# Patient Record
Sex: FEMALE | Race: WHITE | NOT HISPANIC OR LATINO | Employment: OTHER | URBAN - METROPOLITAN AREA
[De-identification: names, ages, dates, MRNs, and addresses within clinical notes are randomized per-mention and may not be internally consistent; named-entity substitution may affect disease eponyms.]

---

## 2020-09-29 DIAGNOSIS — G40.009 PARTIAL IDIOPATHIC EPILEPSY WITH SEIZURES OF LOCALIZED ONSET, NOT INTRACTABLE, WITHOUT STATUS EPILEPTICUS (H): ICD-10-CM

## 2020-09-29 DIAGNOSIS — G40.209 EPILEPSY WITH PARTIAL COMPLEX SEIZURES (H): Primary | ICD-10-CM

## 2020-09-29 PROBLEM — R41.3 AMNESIA: Status: ACTIVE | Noted: 2020-09-29

## 2020-09-29 PROBLEM — M54.17 LUMBOSACRAL RADICULOPATHY: Status: ACTIVE | Noted: 2020-09-29

## 2020-09-29 PROBLEM — G62.9 NEUROPATHY: Status: ACTIVE | Noted: 2020-09-29

## 2020-09-29 RX ORDER — LEVETIRACETAM 500 MG/1
1000 TABLET ORAL AT BEDTIME
Qty: 180 TABLET | Refills: 0 | Status: SHIPPED | OUTPATIENT
Start: 2020-09-29 | End: 2021-02-08

## 2020-09-29 RX ORDER — LEVETIRACETAM 500 MG/1
TABLET ORAL
COMMUNITY
Start: 2019-12-26 | End: 2020-09-29

## 2020-10-16 ENCOUNTER — VIRTUAL VISIT (OUTPATIENT)
Dept: NEUROLOGY | Facility: CLINIC | Age: 85
End: 2020-10-16
Payer: COMMERCIAL

## 2020-10-16 VITALS — BODY MASS INDEX: 29.19 KG/M2 | HEIGHT: 64 IN | WEIGHT: 171 LBS

## 2020-10-16 DIAGNOSIS — R41.3 MEMORY DIFFICULTY: ICD-10-CM

## 2020-10-16 DIAGNOSIS — G40.209 PARTIAL SYMPTOMATIC EPILEPSY WITH COMPLEX PARTIAL SEIZURES, NOT INTRACTABLE, WITHOUT STATUS EPILEPTICUS (H): Primary | ICD-10-CM

## 2020-10-16 PROCEDURE — 99213 OFFICE O/P EST LOW 20 MIN: CPT | Mod: 95 | Performed by: PSYCHIATRY & NEUROLOGY

## 2020-10-16 RX ORDER — CALCIUM CARBONATE 500(1250)
1 TABLET ORAL 2 TIMES DAILY
COMMUNITY
End: 2024-01-23

## 2020-10-16 RX ORDER — LEVETIRACETAM 500 MG/1
500 TABLET ORAL AT BEDTIME
Qty: 90 TABLET | Refills: 3 | Status: SHIPPED | OUTPATIENT
Start: 2020-10-16 | End: 2021-02-03 | Stop reason: DRUGHIGH

## 2020-10-16 RX ORDER — OMEGA-3 FATTY ACIDS/FISH OIL 300-1000MG
CAPSULE ORAL
COMMUNITY
End: 2024-01-23

## 2020-10-16 RX ORDER — LOSARTAN POTASSIUM 50 MG/1
50 TABLET ORAL DAILY
COMMUNITY
Start: 2020-09-28

## 2020-10-16 SDOH — HEALTH STABILITY: MENTAL HEALTH: HOW OFTEN DO YOU HAVE 6 OR MORE DRINKS ON ONE OCCASION?: NOT ASKED

## 2020-10-16 SDOH — HEALTH STABILITY: MENTAL HEALTH: HOW MANY STANDARD DRINKS CONTAINING ALCOHOL DO YOU HAVE ON A TYPICAL DAY?: NOT ASKED

## 2020-10-16 SDOH — HEALTH STABILITY: MENTAL HEALTH: HOW OFTEN DO YOU HAVE A DRINK CONTAINING ALCOHOL?: 2-3 TIMES A WEEK

## 2020-10-16 ASSESSMENT — MIFFLIN-ST. JEOR: SCORE: 1200.65

## 2020-10-16 NOTE — PROGRESS NOTES
NEUROLOGY NOTE        Assessment/Plan        Complex partial seizure. Would continue medication. Discussed options and risks vs benefit of different options. She is taking 500 mg hs for many years, no recurrence of seizure. Last was 2012. Last level 1/31/2020 at 12.5.     Polyneuropathy; tingling and numb, cold in feet, no progression.     Mild memory concern.     Mild occasional stress/frustration situational. No suicidal.     Plan:   Continue current mediations.   Check drug level  F/u in 1 year.     This is a telephone visit due to COVID-19 Pandemic to mitigate potential disease spreading. Consent to charge obtained for call visit. Total time spent about 15 minutes. Additional 5 min chart review and managing order.            SUBJECTIVE         The patient is here for probable seizures and polyneuropathy. She has seen Dr. Darby, then transferred the care to Dr. Bryant in 2014.    No seizures or any side effects. One grandchild was diagnosed with Seizure disorder, one child has it too due to brain tumor.    No falls. No significant new neurologically issues. Polyneuropathy associated with leg numbness still present. Minimal changes. No weakness. Swimming regularly at [employer]. 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 Lives with her  who is 90. Has very active life.    In brief, the patient had her first episode in 2009 where she told her  that she was feeling funny or weird and then started perseverating on the same question over and over. symptoms lasted about 5 hours. She states that she felt fine, but she could not recall the details thereafter. Brain imaging did not show any acute pathology, but some mild atrophy. EEGs in 2009 and 2012 which were all normal. The patient was then discharged with a presumed diagnosis of transient global amnesia. The patient was noted to have high blood pressure during the time in the first episode with systolic pressures in the 200s. She then had a second  episode in September 2012 while she was doing laundry in the afternoon and suddenly told her  that she felt weird again and her  then told her to call her daughter to tell them that they are going to the hospital. She remembers only that she was telling her daughter that they are going to the hospital and she was able to communicate with her  the directions to getting to the hospital, but then once she got there, she cannot recall the rest of the events. She does not have any details about what her  witnessed at that time. The third episode was in December 2012, where she had eaten liver earlier in the afternoon and by the evening time had a sudden urge to go to the bathroom. She had diarrhea and abdominal cramping, and some nausea and vomiting. She then remembers having to clean up the mess in the bathroom, but does not recall actually doing that. She then approached her  and told her  she felt funny and she started perseverating again on the same questions and her  then called 911 and took her into the hospital. Again, brain imaging did not show any problems. EEG was normal. She does not know why she had such horrible GI upset at that time, but knew she was not feeling herself.    A 24-hour ambulatory EEG in 2014 showed occasional sharp waves over the left temporal lobe region. She was off Trileptal at that time, then she was started on Keppra 500 mg bid. Since then has no more spells, and no side effects of medications.    No family history of seizure disorder.    Has had lower back surgery in the past for pain in the back and right lower extremity.    Has had numbness in feet for a few years. No report of progression. No worsening. No falls but slightly worse with balance over years.    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     Occasionally sleeping difficulty.    Memory and mood fine.                 Review of system  "    10 point system review otherwise unremarkable    PHYSICAL EXAMINATION     Vital signs in last 24 hours:  Vitals:    10/16/20 1009   Weight: 77.6 kg (171 lb)   Height: 1.626 m (5' 4\")       This is a telephone visit during the pandemic       Problem List     Patient Active Problem List    Diagnosis Date Noted     Epilepsy with partial complex seizures (H) 09/29/2020     Priority: Medium     Lumbosacral radiculopathy 09/29/2020     Priority: Medium     Neuropathy 09/29/2020     Priority: Medium     Amnesia 09/29/2020     Priority: Medium         Past medical history     Memory difficulty.      Family history     Family History   Problem Relation Age of Onset     Coronary Artery Disease Father          Social history     Social History     Socioeconomic History     Marital status:      Spouse name: Not on file     Number of children: Not on file     Years of education: Not on file     Highest education level: Not on file   Occupational History     Not on file   Social Needs     Financial resource strain: Not on file     Food insecurity     Worry: Not on file     Inability: Not on file     Transportation needs     Medical: Not on file     Non-medical: Not on file   Tobacco Use     Smoking status: Never Smoker     Smokeless tobacco: Never Used   Substance and Sexual Activity     Alcohol use: Yes     Alcohol/week: 1.0 standard drinks     Types: 1 Glasses of wine per week     Frequency: 2-3 times a week     Drug use: Not on file     Sexual activity: Not on file   Lifestyle     Physical activity     Days per week: Not on file     Minutes per session: Not on file     Stress: Not on file   Relationships     Social connections     Talks on phone: Not on file     Gets together: Not on file     Attends [employer] service: Not on file     Active member of club or organization: Not on file     Attends meetings of clubs or organizations: Not on file     Relationship status: Not on file     Intimate partner violence     " Fear of current or ex partner: Not on file     Emotionally abused: Not on file     Physically abused: Not on file     Forced sexual activity: Not on file   Other Topics Concern     Parent/sibling w/ CABG, MI or angioplasty before 65F 55M? Not Asked   Social History Narrative     Not on file         Allergy     Patient has no known allergies.    MEDICATIONS List     Current Outpatient Medications   Medication Sig Dispense Refill     Aspirin Buf,CaCarb-MgCarb-MgO, 81 MG TABS        calcium carbonate (OS-NAVDEEP) 500 MG tablet Take 1 tablet by mouth 2 times daily       levETIRAcetam (KEPPRA) 500 MG tablet Take 2 tablets (1,000 mg) by mouth At Bedtime 180 tablet 0     losartan (COZAAR) 50 MG tablet Take 50 mg by mouth daily       omega 3 1000 MG CAPS                      Malina Casper MD, MD, PhD  Neurology   Office tel: [phone]

## 2020-10-16 NOTE — LETTER
10/16/2020         RE: Tami Zaldivar  [address]  [place] [address]        Dear Colleague,    Thank you for referring your patient, Tami Zaldivar, to the [facility] [place]. Please see a copy of my visit note below.        NEUROLOGY NOTE        Assessment/Plan        Complex partial seizure. Would continue medication. Discussed options and risks vs benefit of different options. She is taking 500 mg hs for many years, no recurrence of seizure. Last was 2012. Last level 1/31/2020 at 12.5.     Polyneuropathy; tingling and numb, cold in feet, no progression.     Mild memory concern.     Mild occasional stress/frustration situational. No suicidal.     Plan:   Continue current mediations.   Check drug level  F/u in 1 year.     This is a telephone visit due to COVID-19 Pandemic to mitigate potential disease spreading. Consent to charge obtained for call visit. Total time spent about 15 minutes. Additional 5 min chart review and managing order.            SUBJECTIVE         The patient is here for probable seizures and polyneuropathy. She has seen Dr. Darby, then transferred the care to Dr. Bryant in 2014.    No seizures or any side effects. One grandchild was diagnosed with Seizure disorder, one child has it too due to brain tumor.    No falls. No significant new neurologically issues. Polyneuropathy associated with leg numbness still present. Minimal changes. No weakness. 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 Lives with her  who is 90. Has very active life.    In brief, the patient had her first episode in 2009 where she told her  that she was feeling funny or weird and then started perseverating on the same question over and over. symptoms lasted about 5 hours. She states that she felt fine, but she could not recall the details thereafter. Brain imaging did not show any acute pathology, but some mild atrophy. EEGs in  2009 and 2012 which were all normal. The patient was then discharged with a presumed diagnosis of transient global amnesia. The patient was noted to have high blood pressure during the time in the first episode with systolic pressures in the 200s. She then had a second episode in September 2012 while she was doing laundry in the afternoon and suddenly told her  that she felt weird again and her  then told her to call her daughter to tell them that they are going to the hospital. She remembers only that she was telling her daughter that they are going to the hospital and she was able to communicate with her  the directions to getting to the hospital, but then once she got there, she cannot recall the rest of the events. She does not have any details about what her  witnessed at that time. The third episode was in December 2012, where she had eaten liver earlier in the afternoon and by the evening time had a sudden urge to go to the bathroom. She had diarrhea and abdominal cramping, and some nausea and vomiting. She then remembers having to clean up the mess in the bathroom, but does not recall actually doing that. She then approached her  and told her  she felt funny and she started perseverating again on the same questions and her  then called 911 and took her into the hospital. Again, brain imaging did not show any problems. EEG was normal. She does not know why she had such horrible GI upset at that time, but knew she was not feeling herself.    A 24-hour ambulatory EEG in 2014 showed occasional sharp waves over the left temporal lobe region. She was off Trileptal at that time, then she was started on Keppra 500 mg bid. Since then has no more spells, and no side effects of medications.    No family history of seizure disorder.    Has had lower back surgery in the past for pain in the back and right lower extremity.    Has had numbness in feet for a few years. No  "report of progression. No worsening. No falls but slightly worse with balance over years.    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     Occasionally sleeping difficulty.    Memory and mood fine.                 Review of system     10 point system review otherwise unremarkable    PHYSICAL EXAMINATION     Vital signs in last 24 hours:  Vitals:    10/16/20 1009   Weight: 77.6 kg (171 lb)   Height: 1.626 m (5' 4\")       This is a telephone visit during the pandemic       Problem List     Patient Active Problem List    Diagnosis Date Noted     Epilepsy with partial complex seizures (H) 09/29/2020     Priority: Medium     Lumbosacral radiculopathy 09/29/2020     Priority: Medium     Neuropathy 09/29/2020     Priority: Medium     Amnesia 09/29/2020     Priority: Medium         Past medical history     Memory difficulty.      Family history     Family History   Problem Relation Age of Onset     Coronary Artery Disease Father          Social history     Social History     Socioeconomic History     Marital status:      Spouse name: Not on file     Number of children: Not on file     Years of education: Not on file     Highest education level: Not on file   Occupational History     Not on file   Social Needs     Financial resource strain: Not on file     Food insecurity     Worry: Not on file     Inability: Not on file     Transportation needs     Medical: Not on file     Non-medical: Not on file   Tobacco Use     Smoking status: Never Smoker     Smokeless tobacco: Never Used   Substance and Sexual Activity     Alcohol use: Yes     Alcohol/week: 1.0 standard drinks     Types: 1 Glasses of wine per week     Frequency: 2-3 times a week     Drug use: Not on file     Sexual activity: Not on file   Lifestyle     Physical activity     Days per week: Not on file     Minutes per session: Not on file     Stress: Not on file   Relationships     Social connections     Talks on phone: Not " on file     Gets together: Not on file     Attends Gnosticist service: Not on file     Active member of club or organization: Not on file     Attends meetings of clubs or organizations: Not on file     Relationship status: Not on file     Intimate partner violence     Fear of current or ex partner: Not on file     Emotionally abused: Not on file     Physically abused: Not on file     Forced sexual activity: Not on file   Other Topics Concern     Parent/sibling w/ CABG, MI or angioplasty before 65F 55M? Not Asked   Social History Narrative     Not on file         Allergy     Patient has no known allergies.    MEDICATIONS List     Current Outpatient Medications   Medication Sig Dispense Refill     Aspirin Buf,CaCarb-MgCarb-MgO, 81 MG TABS        calcium carbonate (OS-NAVDEEP) 500 MG tablet Take 1 tablet by mouth 2 times daily       levETIRAcetam (KEPPRA) 500 MG tablet Take 2 tablets (1,000 mg) by mouth At Bedtime 180 tablet 0     losartan (COZAAR) 50 MG tablet Take 50 mg by mouth daily       omega 3 1000 MG CAPS                      Malina Casper MD, MD, PhD  Neurology   Office tel: [phone]          Again, thank you for allowing me to participate in the care of your patient.        Sincerely,        Malina Casper MD

## 2020-10-27 ENCOUNTER — RECORDS - HEALTHEAST (OUTPATIENT)
Dept: LAB | Facility: CLINIC | Age: 85
End: 2020-10-27

## 2020-10-27 LAB — LEVETIRACETAM (KEPPRA): 10.9 UG/ML (ref 6–46)

## 2020-12-08 ENCOUNTER — TELEPHONE (OUTPATIENT)
Dept: NEUROLOGY | Facility: CLINIC | Age: 85
End: 2020-12-08

## 2020-12-08 DIAGNOSIS — G40.209 PARTIAL SYMPTOMATIC EPILEPSY WITH COMPLEX PARTIAL SEIZURES, NOT INTRACTABLE, WITHOUT STATUS EPILEPTICUS (H): Primary | ICD-10-CM

## 2020-12-08 NOTE — TELEPHONE ENCOUNTER
Talked with patient about his subtherapeutic level of Keppra on 12/2/2020.  No seizures.  She is taking 1 tablets in the evening only for long time.  Though her previous drug levels have been always therapeutic over the years.  Current level undetectable for Keppra.      Please help to schedule her a EEG study and clinical visit right after EEG to discuss.  We will see if we can taper off the medication completely.  I will make no changes on her medications at this time.

## 2020-12-08 NOTE — TELEPHONE ENCOUNTER
Please see Dr. Casper's request to schedule eeg and follow up  Gurpreet Fernandez CMA on 12/8/2020 at 12:33 PM

## 2020-12-08 NOTE — TELEPHONE ENCOUNTER
Incoming fax for [facility] lab results. Pulled in through care every where. Please review and advise   Gurpreet Fernandez CMA on 12/8/2020 at 12:18 PM

## 2021-02-03 ENCOUNTER — OFFICE VISIT (OUTPATIENT)
Dept: NEUROLOGY | Facility: CLINIC | Age: 86
End: 2021-02-03
Attending: PSYCHIATRY & NEUROLOGY
Payer: COMMERCIAL

## 2021-02-03 VITALS
DIASTOLIC BLOOD PRESSURE: 66 MMHG | BODY MASS INDEX: 29.02 KG/M2 | HEART RATE: 73 BPM | WEIGHT: 170 LBS | HEIGHT: 64 IN | SYSTOLIC BLOOD PRESSURE: 139 MMHG

## 2021-02-03 DIAGNOSIS — G40.209 PARTIAL SYMPTOMATIC EPILEPSY WITH COMPLEX PARTIAL SEIZURES, NOT INTRACTABLE, WITHOUT STATUS EPILEPTICUS (H): ICD-10-CM

## 2021-02-03 DIAGNOSIS — G40.209 PARTIAL SYMPTOMATIC EPILEPSY WITH COMPLEX PARTIAL SEIZURES, NOT INTRACTABLE, WITHOUT STATUS EPILEPTICUS (H): Primary | ICD-10-CM

## 2021-02-03 DIAGNOSIS — G62.9 NEUROPATHY: ICD-10-CM

## 2021-02-03 DIAGNOSIS — R41.3 MEMORY DIFFICULTY: ICD-10-CM

## 2021-02-03 PROCEDURE — 99213 OFFICE O/P EST LOW 20 MIN: CPT | Mod: 25 | Performed by: PSYCHIATRY & NEUROLOGY

## 2021-02-03 PROCEDURE — 95816 EEG AWAKE AND DROWSY: CPT | Performed by: PSYCHIATRY & NEUROLOGY

## 2021-02-03 ASSESSMENT — MIFFLIN-ST. JEOR: SCORE: 1191.11

## 2021-02-03 NOTE — LETTER
2/3/2021         RE: Tami Zaldivar  [address]  [place] [place] [address]        Dear Colleague,    Thank you for referring your patient, Tami Zaldivar, to the [facility] [place]. Please see a copy of my visit note below.        NEUROLOGY NOTE        Assessment/Plan          Complex partial seizure. Would continue medication. Discussed options and risks vs benefit of different options. She is taking 500 mg hs for many years, no recurrence of seizure. Last was 2012. Last level 1/31/2020 at 12.5.     Polyneuropathy; tingling and numb, cold in feet, no progression.      Mild memory concern.      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 No suicidal.      Plan:   Continue current mediations.   Check drug level again with the last level low but she was very sick in 12/2020 after flu shot.   F/u in 6 months.  Explained that we may not see her blood test results regarding the drug level.  If she did not receive the test result after blood drawn for 1 week she will call us then will jeny after the result.       SUBJECTIVE         The patient is here for probable seizures and polyneuropathy. She has seen Dr. Darby, then transferred the care to Dr. Bryant in 2014.    No reported seizures or any side effects. Check drug level again with the last level low but she was very sick in 12/2020 after flu shot.     EEG showing left sharp appearing activities and slowing.     One grandchild was diagnosed with Seizure disorder, one child has it too due to brain tumor.    No falls. No significant new neurologically issues. Polyneuropathy associated with leg numbness still present. Minimal changes. No weakness. 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 Lives with her  who is 90. Has very active life.    In brief, the patient had her first episode in 2009 where she told her   that she was feeling funny or weird and then started perseverating on the same question over and over. symptoms lasted about 5 hours. She states that she felt fine, but she could not recall the details thereafter. Brain imaging did not show any acute pathology, but some mild atrophy. EEGs in 2009 and 2012 which were all normal. The patient was then discharged with a presumed diagnosis of transient global amnesia. The patient was noted to have high blood pressure during the time in the first episode with systolic pressures in the 200s. She then had a second episode in September 2012 while she was doing laundry in the afternoon and suddenly told her  that she felt weird again and her  then told her to call her daughter to tell them that they are going to the hospital. She remembers only that she was telling her daughter that they are going to the hospital and she was able to communicate with her  the directions to getting to the hospital, but then once she got there, she cannot recall the rest of the events. She does not have any details about what her  witnessed at that time. The third episode was in December 2012, where she had eaten liver earlier in the afternoon and by the evening time had a sudden urge to go to the bathroom. She had diarrhea and abdominal cramping, and some nausea and vomiting. She then remembers having to clean up the mess in the bathroom, but does not recall actually doing that. She then approached her  and told her  she felt funny and she started perseverating again on the same questions and her  then called 911 and took her into the hospital. Again, brain imaging did not show any problems. EEG was normal. She does not know why she had such horrible GI upset at that time, but knew she was not feeling herself.    A 24-hour ambulatory EEG in 2014 showed occasional sharp waves over the left temporal lobe region. She was off Trileptal at that time,  "then she was started on Keppra 500 mg bid. Since then has no more spells, and no side effects of medications.    No family history of seizure disorder.    Has had lower back surgery in the past for pain in the back and right lower extremity.    Has had numbness in feet for a few years. No report of progression. No worsening. No falls but slightly worse with balance over years.    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     Occasionally sleeping difficulty.    Memory and mood fine.    CONCLUSION 2012  This is a normal EEG for the awake and drowsy states.     CONCLUSION: MRI brain 11/2009  1.  No acute infarct seen.   2.  Mild to moderate chronic small vessel ischemic changes in the white   matter of both hemispheres.   3.  No evidence of intracranial mass, hemorrhage or obstructive   hydrocephalus      CONCLUSION: 2012  1. Age-related changes.  2. No acute superimposed intracranial abnormality apparent.     12/2/2020 1/31/2020 9/10/2018 10/16/2017 7/15/2016 5/2/2014     2.0 (L) 12.5 9.1 10.2 13.8 9.7                  Review of system     10 point system review otherwise unremarkable    PHYSICAL EXAMINATION     Vital signs in last 24 hours:  Vitals:    02/03/21 1338   BP: 139/66   Pulse: 73   Weight: 77.1 kg (170 lb)   Height: 1.626 m (5' 4\")     No acute distress.  Normal mental status, language and speech.  Cranial nerves II through XII largely intact.  Adequate muscle bulk tone and strength in 4 extremities.  No focal sensory difficulty.  Coordination, gait and station appropriate.  To give good history.  Cognitive function adequate.  No abnormal involuntary movements.  Mood appropriate.        Problem List     Patient Active Problem List    Diagnosis Date Noted     Epilepsy with partial complex seizures (H) 09/29/2020     Priority: Medium     Lumbosacral radiculopathy 09/29/2020     Priority: Medium     Neuropathy 09/29/2020     Priority: Medium     Amnesia 09/29/2020     " Priority: Medium         Past medical history     No past surgical history on file.    Past Medical History:   Diagnosis Date     Complex partial seizure (H)            Family history     Family History   Problem Relation Age of Onset     Coronary Artery Disease Father          Social history     Social History     Socioeconomic History     Marital status:      Spouse name: Not on file     Number of children: Not on file     Years of education: Not on file     Highest education level: Not on file   Occupational History     Not on file   Social Needs     Financial resource strain: Not on file     Food insecurity     Worry: Not on file     Inability: Not on file     Transportation needs     Medical: Not on file     Non-medical: Not on file   Tobacco Use     Smoking status: Never Smoker     Smokeless tobacco: Never Used   Substance and Sexual Activity     Alcohol use: Yes     Alcohol/week: 1.0 standard drinks     Types: 1 Glasses of wine per week     Frequency: 2-3 times a week     Drug use: Not on file     Sexual activity: Not on file   Lifestyle     Physical activity     Days per week: Not on file     Minutes per session: Not on file     Stress: Not on file   Relationships     Social connections     Talks on phone: Not on file     Gets together: Not on file     Attends Pentecostalism service: Not on file     Active member of club or organization: Not on file     Attends meetings of clubs or organizations: Not on file     Relationship status: Not on file     Intimate partner violence     Fear of current or ex partner: Not on file     Emotionally abused: Not on file     Physically abused: Not on file     Forced sexual activity: Not on file   Other Topics Concern     Parent/sibling w/ CABG, MI or angioplasty before 65F 55M? Not Asked   Social History Narrative     Not on file         Allergy     Patient has no known allergies.    MEDICATIONS List     Current Outpatient Medications   Medication Sig Dispense Refill      Aspirin Buf,CaCarb-MgCarb-MgO, 81 MG TABS        calcium carbonate (OS-NAVDEEP) 500 MG tablet Take 1 tablet by mouth 2 times daily       levETIRAcetam (KEPPRA) 500 MG tablet Take 2 tablets (1,000 mg) by mouth At Bedtime 180 tablet 0     losartan (COZAAR) 50 MG tablet Take 50 mg by mouth daily       omega 3 1000 MG CAPS                    Malina Casper MD, MD, PhD  Neurology   Office tel: [phone]        This note was dictated using voice recognition software.  Any grammatical or context distortions are unintentional and inherent to the software. The note is tailored to serve physicians for communications among them, who knows what are the most important elements of history taken for disease diagnosis and differentials as well as management plans. Due to time factors, the notes are in general not reviewed before signing. Again due to time factor, follow-up notes often carries over old notes if they are relevant, so most clinic time is dedicated to interviewing with patients and caregivers, on clinical assessment, coordinating care and management.         Again, thank you for allowing me to participate in the care of your patient.        Sincerely,        Malina Casper MD

## 2021-02-03 NOTE — PROGRESS NOTES
NEUROLOGY NOTE        Assessment/Plan          Complex partial seizure. Would continue medication. Discussed options and risks vs benefit of different options. She is taking 500 mg hs for many years, no recurrence of seizure. Last was 2012. Last level 1/31/2020 at 12.5.     Polyneuropathy; tingling and numb, cold in feet, no progression.      Mild memory concern.      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 No suicidal.      Plan:   Continue current mediations.   Check drug level again with the last level low but she was very sick in 12/2020 after flu shot.   F/u in 6 months.  Explained that we may not see her blood test results regarding the drug level.  If she did not receive the test result after blood drawn for 1 week she will call us then will jeny after the result.       SUBJECTIVE         The patient is here for probable seizures and polyneuropathy. She has seen Dr. Darby, then transferred the care to Dr. Bryant in 2014.    No reported seizures or any side effects. Check drug level again with the last level low but she was very sick in 12/2020 after flu shot.     EEG showing left sharp appearing activities and slowing.     One grandchild was diagnosed with Seizure disorder, one child has it too due to brain tumor.    No falls. No significant new neurologically issues. Polyneuropathy associated with leg numbness still present. Minimal changes. No weakness. Swimming regularly at [place]. 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 Lives with her  who is 90. Has very active life.    In brief, the patient had her first episode in 2009 where she told her  that she was feeling funny or weird and then started perseverating on the same question over and over. symptoms lasted about 5 hours. She states that she felt fine, but she could not recall the details thereafter. Brain imaging did not show any acute pathology, but some  mild atrophy. EEGs in 2009 and 2012 which were all normal. The patient was then discharged with a presumed diagnosis of transient global amnesia. The patient was noted to have high blood pressure during the time in the first episode with systolic pressures in the 200s. She then had a second episode in September 2012 while she was doing laundry in the afternoon and suddenly told her  that she felt weird again and her  then told her to call her daughter to tell them that they are going to the hospital. She remembers only that she was telling her daughter that they are going to the hospital and she was able to communicate with her  the directions to getting to the hospital, but then once she got there, she cannot recall the rest of the events. She does not have any details about what her  witnessed at that time. The third episode was in December 2012, where she had eaten liver earlier in the afternoon and by the evening time had a sudden urge to go to the bathroom. She had diarrhea and abdominal cramping, and some nausea and vomiting. She then remembers having to clean up the mess in the bathroom, but does not recall actually doing that. She then approached her  and told her  she felt funny and she started perseverating again on the same questions and her  then called 911 and took her into the hospital. Again, brain imaging did not show any problems. EEG was normal. She does not know why she had such horrible GI upset at that time, but knew she was not feeling herself.    A 24-hour ambulatory EEG in 2014 showed occasional sharp waves over the left temporal lobe region. She was off Trileptal at that time, then she was started on Keppra 500 mg bid. Since then has no more spells, and no side effects of medications.    No family history of seizure disorder.    Has had lower back surgery in the past for pain in the back and right lower extremity.    Has had numbness in feet  "for a few years. No report of progression. No worsening. No falls but slightly worse with balance over years.    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     Occasionally sleeping difficulty.    Memory and mood fine.    CONCLUSION 2012  This is a normal EEG for the awake and drowsy states.     CONCLUSION: MRI brain 11/2009  1.  No acute infarct seen.   2.  Mild to moderate chronic small vessel ischemic changes in the white   matter of both hemispheres.   3.  No evidence of intracranial mass, hemorrhage or obstructive   hydrocephalus      CONCLUSION: 2012  1. Age-related changes.  2. No acute superimposed intracranial abnormality apparent.     12/2/2020 1/31/2020 9/10/2018 10/16/2017 7/15/2016 5/2/2014     2.0 (L) 12.5 9.1 10.2 13.8 9.7                  Review of system     10 point system review otherwise unremarkable    PHYSICAL EXAMINATION     Vital signs in last 24 hours:  Vitals:    02/03/21 1338   BP: 139/66   Pulse: 73   Weight: 77.1 kg (170 lb)   Height: 1.626 m (5' 4\")     No acute distress.  Normal mental status, language and speech.  Cranial nerves II through XII largely intact.  Adequate muscle bulk tone and strength in 4 extremities.  No focal sensory difficulty.  Coordination, gait and station appropriate.  To give good history.  Cognitive function adequate.  No abnormal involuntary movements.  Mood appropriate.        Problem List     Patient Active Problem List    Diagnosis Date Noted     Epilepsy with partial complex seizures (H) 09/29/2020     Priority: Medium     Lumbosacral radiculopathy 09/29/2020     Priority: Medium     Neuropathy 09/29/2020     Priority: Medium     Amnesia 09/29/2020     Priority: Medium         Past medical history     No past surgical history on file.    Past Medical History:   Diagnosis Date     Complex partial seizure (H)            Family history     Family History   Problem Relation Age of Onset     Coronary Artery Disease Father  "         Social history     Social History     Socioeconomic History     Marital status:      Spouse name: Not on file     Number of children: Not on file     Years of education: Not on file     Highest education level: Not on file   Occupational History     Not on file   Social Needs     Financial resource strain: Not on file     Food insecurity     Worry: Not on file     Inability: Not on file     Transportation needs     Medical: Not on file     Non-medical: Not on file   Tobacco Use     Smoking status: Never Smoker     Smokeless tobacco: Never Used   Substance and Sexual Activity     Alcohol use: Yes     Alcohol/week: 1.0 standard drinks     Types: 1 Glasses of wine per week     Frequency: 2-3 times a week     Drug use: Not on file     Sexual activity: Not on file   Lifestyle     Physical activity     Days per week: Not on file     Minutes per session: Not on file     Stress: Not on file   Relationships     Social connections     Talks on phone: Not on file     Gets together: Not on file     Attends Hindu service: Not on file     Active member of club or organization: Not on file     Attends meetings of clubs or organizations: Not on file     Relationship status: Not on file     Intimate partner violence     Fear of current or ex partner: Not on file     Emotionally abused: Not on file     Physically abused: Not on file     Forced sexual activity: Not on file   Other Topics Concern     Parent/sibling w/ CABG, MI or angioplasty before 65F 55M? Not Asked   Social History Narrative     Not on file         Allergy     Patient has no known allergies.    MEDICATIONS List     Current Outpatient Medications   Medication Sig Dispense Refill     Aspirin Buf,CaCarb-MgCarb-MgO, 81 MG TABS        calcium carbonate (OS-NAVDEEP) 500 MG tablet Take 1 tablet by mouth 2 times daily       levETIRAcetam (KEPPRA) 500 MG tablet Take 2 tablets (1,000 mg) by mouth At Bedtime 180 tablet 0     losartan (COZAAR) 50 MG tablet Take 50  mg by mouth daily       omega 3 1000 MG CAPS                    Malina Casper MD, MD, PhD  Neurology   Office tel: [phone]        This note was dictated using voice recognition software.  Any grammatical or context distortions are unintentional and inherent to the software. The note is tailored to serve physicians for communications among them, who knows what are the most important elements of history taken for disease diagnosis and differentials as well as management plans. Due to time factors, the notes are in general not reviewed before signing. Again due to time factor, follow-up notes often carries over old notes if they are relevant, so most clinic time is dedicated to interviewing with patients and caregivers, on clinical assessment, coordinating care and management.

## 2021-02-06 DIAGNOSIS — G40.009 PARTIAL IDIOPATHIC EPILEPSY WITH SEIZURES OF LOCALIZED ONSET, NOT INTRACTABLE, WITHOUT STATUS EPILEPTICUS (H): ICD-10-CM

## 2021-02-08 RX ORDER — LEVETIRACETAM 500 MG/1
1000 TABLET ORAL AT BEDTIME
Qty: 180 TABLET | Refills: 3 | Status: SHIPPED | OUTPATIENT
Start: 2021-02-08 | End: 2022-03-17

## 2021-05-25 ENCOUNTER — RECORDS - HEALTHEAST (OUTPATIENT)
Dept: ADMINISTRATIVE | Facility: CLINIC | Age: 86
End: 2021-05-25

## 2021-05-27 ENCOUNTER — RECORDS - HEALTHEAST (OUTPATIENT)
Dept: ADMINISTRATIVE | Facility: CLINIC | Age: 86
End: 2021-05-27

## 2021-05-28 ENCOUNTER — RECORDS - HEALTHEAST (OUTPATIENT)
Dept: ADMINISTRATIVE | Facility: CLINIC | Age: 86
End: 2021-05-28

## 2022-03-17 DIAGNOSIS — G40.009 PARTIAL IDIOPATHIC EPILEPSY WITH SEIZURES OF LOCALIZED ONSET, NOT INTRACTABLE, WITHOUT STATUS EPILEPTICUS (H): ICD-10-CM

## 2022-03-17 RX ORDER — LEVETIRACETAM 500 MG/1
1000 TABLET ORAL AT BEDTIME
Qty: 60 TABLET | Refills: 0 | Status: SHIPPED | OUTPATIENT
Start: 2022-03-17 | End: 2022-04-18

## 2022-03-17 NOTE — TELEPHONE ENCOUNTER
Refill request for Keppra. Pt last seen 2/3/21 by Dr. Casper and is currently due for follow up. Will send letter regarding need for follow up. Will also send 30 day supply of medication with zero refills.     Yuli Alfonso RN on 3/17/2022 at 8:45 AM

## 2022-04-16 DIAGNOSIS — G40.009 PARTIAL IDIOPATHIC EPILEPSY WITH SEIZURES OF LOCALIZED ONSET, NOT INTRACTABLE, WITHOUT STATUS EPILEPTICUS (H): ICD-10-CM

## 2022-04-16 NOTE — LETTER
4/16/2022        RE: Tami CEDRIC Zen  [address]  [place] [address]          Dear Ms. Zaldivar,       We recently provided you with medication refills.  Many medications require routine follow-up with your doctor.    Your prescription(s) have been refilled for 30 days so you may have time for the above noted follow-up. Please call to schedule soon so we can assure you have an appointment before your next refills are needed. If you have already made a follow up appointment, please disregard this letter.         Sincerely,      [facility]     (Formerly known as [facility])

## 2022-04-18 RX ORDER — LEVETIRACETAM 500 MG/1
1000 TABLET ORAL AT BEDTIME
Qty: 28 TABLET | Refills: 0 | Status: SHIPPED | OUTPATIENT
Start: 2022-04-18 | End: 2022-04-27

## 2022-04-18 NOTE — TELEPHONE ENCOUNTER
Refill request for Keppra. Pt last seen 2/3/21 by Dr. Casper and is currently due for follow up. Will send letter regarding need for follow up. Will also send 14 day supply of medication with zero refills.     Yuli Alfonso RN on 4/18/2022 at 10:53 AM

## 2022-04-20 ENCOUNTER — TELEPHONE (OUTPATIENT)
Dept: NEUROLOGY | Facility: CLINIC | Age: 87
End: 2022-04-20
Payer: COMMERCIAL

## 2022-04-20 DIAGNOSIS — G40.009 PARTIAL IDIOPATHIC EPILEPSY WITH SEIZURES OF LOCALIZED ONSET, NOT INTRACTABLE, WITHOUT STATUS EPILEPTICUS (H): ICD-10-CM

## 2022-04-20 NOTE — TELEPHONE ENCOUNTER
Spoke with patient and scheduled for an appointment with Dr. Steiner on 8/26/22, next opening.  Patient would like a call back if Dr. Steiner will not be able to continue filling her medications until that appointment date.

## 2022-04-20 NOTE — TELEPHONE ENCOUNTER
Will be able to send in refills until pt's appt in August.     Yuli Alfonso RN on 4/20/2022 at 12:53 PM

## 2022-04-27 RX ORDER — LEVETIRACETAM 500 MG/1
1000 TABLET ORAL AT BEDTIME
Qty: 180 TABLET | Refills: 1 | Status: SHIPPED | OUTPATIENT
Start: 2022-04-27 | End: 2022-08-26

## 2022-04-27 NOTE — TELEPHONE ENCOUNTER
[facility] Call Center    Phone Message    May a detailed message be left on voicemail: yes     Reason for Call: Medication Refill Request    Has the patient contacted the pharmacy for the refill? Yes   Name of medication being requested: Keppra 500 mg  Provider who prescribed the medication: Steiner  Pharmacy: [facility] [id number] [address]  Date medication is needed: in 2 weeks     Patient is scheduled for 8/26 appt with Steiner but will need medication refill before then. Please refill.    Action Taken: Message routed to:  [facility] ([facility] neurology    Travel Screening: Not Applicable

## 2022-04-27 NOTE — TELEPHONE ENCOUNTER
Will send in refills for pt as she has appt on 8/26/22.     Yuli Alfonso RN on 4/27/2022 at 10:55 AM

## 2022-08-26 ENCOUNTER — OFFICE VISIT (OUTPATIENT)
Dept: NEUROLOGY | Facility: CLINIC | Age: 87
End: 2022-08-26
Payer: COMMERCIAL

## 2022-08-26 VITALS
DIASTOLIC BLOOD PRESSURE: 73 MMHG | BODY MASS INDEX: 27.31 KG/M2 | HEIGHT: 64 IN | HEART RATE: 70 BPM | SYSTOLIC BLOOD PRESSURE: 144 MMHG | WEIGHT: 160 LBS

## 2022-08-26 DIAGNOSIS — G40.209 PARTIAL SYMPTOMATIC EPILEPSY WITH COMPLEX PARTIAL SEIZURES, NOT INTRACTABLE, WITHOUT STATUS EPILEPTICUS (H): Primary | ICD-10-CM

## 2022-08-26 PROBLEM — R41.3 AMNESIA: Status: RESOLVED | Noted: 2020-09-29 | Resolved: 2022-08-26

## 2022-08-26 PROBLEM — M51.369 DDD (DEGENERATIVE DISC DISEASE), LUMBAR: Status: ACTIVE | Noted: 2021-07-22

## 2022-08-26 PROBLEM — M81.0 AGE-RELATED OSTEOPOROSIS WITHOUT CURRENT PATHOLOGICAL FRACTURE: Status: ACTIVE | Noted: 2021-07-22

## 2022-08-26 PROCEDURE — 99215 OFFICE O/P EST HI 40 MIN: CPT | Performed by: PSYCHIATRY & NEUROLOGY

## 2022-08-26 RX ORDER — LEVETIRACETAM 500 MG/1
1000 TABLET ORAL AT BEDTIME
Qty: 180 TABLET | Refills: 1 | Status: SHIPPED | OUTPATIENT
Start: 2022-08-26 | End: 2022-08-26

## 2022-08-26 RX ORDER — ACETAMINOPHEN 500 MG
500-1000 TABLET ORAL EVERY 6 HOURS PRN
COMMUNITY

## 2022-08-26 RX ORDER — LEVETIRACETAM 500 MG/1
1000 TABLET ORAL AT BEDTIME
Qty: 180 TABLET | Refills: 3 | Status: SHIPPED | OUTPATIENT
Start: 2022-08-26 | End: 2023-11-14

## 2022-08-26 NOTE — LETTER
2022         RE: Tami Zaldivar  [address]  [place] [address]        Dear Colleague,    Thank you for referring your patient, Tami Zaldivar, to the [facility] [place]. Please see a copy of my visit note below.    NEUROLOGY FOLLOW UP VISIT  NOTE       [facility] [place]  [address] [place] [address]  Tel: [phone]  Fax: [phone]  [email or web address]     Tami Zaldivar,  1933, MRN [id number]  PCP: Joana Meyer  Date: 2022      ASSESSMENT & PLAN     Visit Diagnosis  1. Partial symptomatic epilepsy with complex partial seizures, not intractable, without status epilepticus (H)     Complex partial seizures  88-year-old female with history of HTN, peripheral neuropathy, osteoporosis who is been followed in our clinic for complex partial seizures.  She had 4 such episodes of confusion and in  24-hour EEG showed independent bitemporal sharp activity.  She has been on Keppra and has remained symptom free since then.  I have recommended:    1.  Continue Keppra 1000 mg at bedtime.  I refilled her prescription gave her 90-day supply with 3 refills  2.  Check Keppra level and basic metabolic panel  3.  I reviewed with the patient in detail [place] regulations on seizures and driving. Patient appeared to clearly understand that they prohibited from operating a motor vehicle within 3 months following any seizure or other episode with sudden unconsciousness and that they are required to report any future such seizure to the DMV within 30 days after the event. I also recommended that patient and family review all other activities, and avoid any activities that might lead to self-injury or injury of others.  Such activities include but are not limited to holding babies or young children at heights from which they might be injured if dropped, bathing infants or young children in situations in which they  might drown without continuous interactive care by an adult who is fully capable at all times during the bath, operating power cutting or other tools, handling firearms, exposure to heights from which she might fall, exposure to vessels with hot cooking oil or water, and swimming alone.   4.  Follow-up in 1 year    Thank you again for this referral, please feel free to contact me if you have any questions.    Markel Steiner MD  [facility]  (Formerly, [facility]     HISTORY OF PRESENT ILLNESS     Patient is a 88-year-old female with history of HTN, peripheral neuropathy, lumbar radiculopathy who is being followed in our clinic for partial complex seizure and is here for yearly follow-up.  Previously she was followed by Dr. Malina Vigil in our clinic and is a new patient for me.    Her symptoms started in 2009 when she was perseverating and asking the same question over and over again brain imaging was unremarkable EEG in 2009 and 2012 were normal.  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      She had a similar episode in 2012 and had trouble recalling the name of the daughter and then became confused.  Again EEG was unremarkable.  She had a similar episode later in that year when she had abdominal pain and cramping and had diarrhea and lost consciousness.  Repeat imaging study did not show any abnormality.  A 24-hour EEG was done in 2014 that showed left and right temporal independent sharp activity.  She was started on Keppra and since then had no episodes.      Since her last visit she denies any seizures.  Her  passed away and she is sad about it and worries a lot as previously she would get an aura of dizziness and afterwards would have no recollection at least for few hours.  She lives alone and worries that she might have breakthrough seizure and not even know about it.  She has 2 daughters in the city who Check on her  "regularly     PROBLEM LIST   Patient Active Problem List   Diagnosis Code     Epilepsy with partial complex seizures (H) G40.209     Lumbosacral radiculopathy M54.17     Neuropathy G62.9     Hypertension I10     Age-related osteoporosis without current pathological fracture M81.0     DDD (degenerative disc disease), lumbar M51.36         PAST MEDICAL & SURGICAL HISTORY     Past Medical History:   Patient  has a past medical history of Complex partial seizure (H).    Surgical History:  She  has no past surgical history on file.     SOCIAL HISTORY     Reviewed, and she  reports that she has never smoked. She has never used smokeless tobacco. She reports current alcohol use of about 1.0 standard drink of alcohol per week.     FAMILY HISTORY     Reviewed, and family history includes Coronary Artery Disease in her father.     ALLERGIES     No Known Allergies      REVIEW OF SYSTEMS     A 12 point review of system was performed and was negative except as outlined in the history of present illness.     HOME MEDICATIONS     Current Outpatient Rx   Medication Sig Dispense Refill     acetaminophen (TYLENOL) 500 MG tablet Take 500-1,000 mg by mouth every 6 hours as needed for mild pain       levETIRAcetam (KEPPRA) 500 MG tablet Take 2 tablets (1,000 mg) by mouth At Bedtime 180 tablet 3     losartan (COZAAR) 50 MG tablet Take 50 mg by mouth daily       Aspirin Buf,CaCarb-MgCarb-MgO, 81 MG TABS  (Patient not taking: Reported on 8/26/2022)       calcium carbonate (OS-NAVDEEP) 500 MG tablet Take 1 tablet by mouth 2 times daily (Patient not taking: Reported on 8/26/2022)       omega 3 1000 MG CAPS  (Patient not taking: Reported on 8/26/2022)           PHYSICAL EXAM     Vital signs  BP (!) 144/73 (BP Location: Right arm, Patient Position: Sitting)   Pulse 70   Ht 1.626 m (5' 4\")   Wt 72.6 kg (160 lb)   BMI 27.46 kg/m      Weight:   160 lbs 0 oz    Patient is alert and oriented x4 in no acute distress. Vital signs were reviewed and " are documented in electronic medical record. Neck was supple, no carotid bruits, thyromegaly, JVD, or lymphadenopathy was noted.   NEUROLOGY EXAM:    Patient s speech was normal with no aphasia or dysarthria. Mentation, and affect were also normal.     Funduscopic exam was normal, with normal cup to disc ratio. Cranial nerves II -XII were intact.  Except she is hard of hearing    Patient had decreased mass with 5/5 strength    Sensation was decreased to LT, PP    Reflexes were 1+ symmetrical with downgoing toes.     No dysmetria noted on FNF     Gait testing was normal.     PERTINENT DIAGNOSTIC STUDIES     Following studies were reviewed:     CT BRAIN 11/15/2009  1.  No evidence of acute intracranial pathology    MRI BRAIN 11/15/2009   1.  No acute infarct seen  3.  Mild to moderate chronic small vessel ischemic changes in white matter of both hemisphere  3.  No evidence of intracranial mass, hemorrhage or obstructive hydrocephalus    EEG 11/16/2009  This is a normal study for awake and drowsy states    EEG 9/25/2012  This is a normal EEG for the awake and drowsy state    24-hour EEG 4/3/2014  This is an abnormal EEG recording for 24 hours with the patient described to be in wake, drowsy and sleep states. Patient had the presence of occasional sharp waves seen over the left and right anterior temporal region independently. No definite seizures are seen. There were occasional premature heartbeats seen on the EKG. However, patient was not having any cardiac symptoms at that time. Findings of sharp waves as noted do support a possible epilepsy condition given her clinical history and may suggest a lower potential seizure threshold. Clinical correlation is recommended.     PERTINENT LABS  Following labs were reviewed:  No visits with results within 3 Month(s) from this visit.   Latest known visit with results is:   [facility] on 10/27/2020   Component Date Value     Levetiracetam 10/27/2020 10.9          Total  time spent for face to face visit, reviewing labs/imaging studies, counseling and coordination of care was: 45 Minutes spent on the date of the encounter doing chart review, review of outside records, review of test results, interpretation of tests, patient visit and documentation       This note was dictated using voice recognition software.  Any grammatical or context distortions are unintentional and inherent to the software.    Orders Placed This Encounter   Procedures     Keppra (Levetiracetam) Level     Basic metabolic panel      New Prescriptions    No medications on file     Modified Medications    Modified Medication Previous Medication    LEVETIRACETAM (KEPPRA) 500 MG TABLET levETIRAcetam (KEPPRA) 500 MG tablet       Take 2 tablets (1,000 mg) by mouth At Bedtime    Take 2 tablets (1,000 mg) by mouth At Bedtime                     Again, thank you for allowing me to participate in the care of your patient.        Sincerely,        Markel Steiner MD

## 2022-08-26 NOTE — NURSING NOTE
Chief Complaint   Patient presents with     Seizures     Annual follow up- transfer care from Dr Pennie Esquivel CMA on 8/26/2022 at 11:15 AM

## 2022-08-26 NOTE — PROGRESS NOTES
NEUROLOGY FOLLOW UP VISIT  NOTE       [facility] NEUROLOGY [place]  [address] [place] [address]  Tel: [phone]  Fax: [phone]  [email or web address]     Tami Zaldivar,  1933, MRN [id number]  PCP: Joana Meyer  Date: 2022      ASSESSMENT & PLAN     Visit Diagnosis  1. Partial symptomatic epilepsy with complex partial seizures, not intractable, without status epilepticus (H)     Complex partial seizures  88-year-old female with history of HTN, peripheral neuropathy, osteoporosis who is been followed in our clinic for complex partial seizures.  She had 4 such episodes of confusion and in  24-hour EEG showed independent bitemporal sharp activity.  She has been on Keppra and has remained symptom free since then.  I have recommended:    1.  Continue Keppra 1000 mg at bedtime.  I refilled her prescription gave her 90-day supply with 3 refills  2.  Check Keppra level and basic metabolic panel  3.  I reviewed with the patient in detail [place] regulations on seizures and driving. Patient appeared to clearly understand that they prohibited from operating a motor vehicle within 3 months following any seizure or other episode with sudden unconsciousness and that they are required to report any future such seizure to the DMV within 30 days after the event. I also recommended that patient and family review all other activities, and avoid any activities that might lead to self-injury or injury of others.  Such activities include but are not limited to holding babies or young children at heights from which they might be injured if dropped, bathing infants or young children in situations in which they might drown without continuous interactive care by an adult who is fully capable at all times during the bath, operating power cutting or other tools, handling firearms, exposure to heights from which she might fall, exposure to vessels with hot cooking oil or water, and  swimming alone.   4.  Follow-up in 1 year    Thank you again for this referral, please feel free to contact me if you have any questions.    Markel Steiner MD  [facility]  (Formerly, [facility]     HISTORY OF PRESENT ILLNESS     Patient is a 88-year-old female with history of HTN, peripheral neuropathy, lumbar radiculopathy who is being followed in our clinic for partial complex seizure and is here for yearly follow-up.  Previously she was followed by Dr. Malina Vigil in our clinic and is a new patient for me.    Her symptoms started in 2009 when she was perseverating and asking the same question over and over again brain imaging was unremarkable EEG in 2009 and 2012 were normal.  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      She had a similar episode in 2012 and had trouble recalling the name of the daughter and then became confused.  Again EEG was unremarkable.  She had a similar episode later in that year when she had abdominal pain and cramping and had diarrhea and lost consciousness.  Repeat imaging study did not show any abnormality.  A 24-hour EEG was done in 2014 that showed left and right temporal independent sharp activity.  She was started on Keppra and since then had no episodes.      Since her last visit she denies any seizures.  Her  passed away and she is sad about it and worries a lot as previously she would get an aura of dizziness and afterwards would have no recollection at least for few hours.  She lives alone and worries that she might have breakthrough seizure and not even know about it.  She has 2 daughters in the city who Check on her regularly     PROBLEM LIST   Patient Active Problem List   Diagnosis Code     Epilepsy with partial complex seizures (H) G40.209     Lumbosacral radiculopathy M54.17     Neuropathy G62.9     Hypertension I10     Age-related osteoporosis without current pathological fracture M81.0  "    DDD (degenerative disc disease), lumbar M51.36         PAST MEDICAL & SURGICAL HISTORY     Past Medical History:   Patient  has a past medical history of Complex partial seizure (H).    Surgical History:  She  has no past surgical history on file.     SOCIAL HISTORY     Reviewed, and she  reports that she has never smoked. She has never used smokeless tobacco. She reports current alcohol use of about 1.0 standard drink of alcohol per week.     FAMILY HISTORY     Reviewed, and family history includes Coronary Artery Disease in her father.     ALLERGIES     No Known Allergies      REVIEW OF SYSTEMS     A 12 point review of system was performed and was negative except as outlined in the history of present illness.     HOME MEDICATIONS     Current Outpatient Rx   Medication Sig Dispense Refill     acetaminophen (TYLENOL) 500 MG tablet Take 500-1,000 mg by mouth every 6 hours as needed for mild pain       levETIRAcetam (KEPPRA) 500 MG tablet Take 2 tablets (1,000 mg) by mouth At Bedtime 180 tablet 3     losartan (COZAAR) 50 MG tablet Take 50 mg by mouth daily       Aspirin Buf,CaCarb-MgCarb-MgO, 81 MG TABS  (Patient not taking: Reported on 8/26/2022)       calcium carbonate (OS-NAVDEEP) 500 MG tablet Take 1 tablet by mouth 2 times daily (Patient not taking: Reported on 8/26/2022)       omega 3 1000 MG CAPS  (Patient not taking: Reported on 8/26/2022)           PHYSICAL EXAM     Vital signs  BP (!) 144/73 (BP Location: Right arm, Patient Position: Sitting)   Pulse 70   Ht 1.626 m (5' 4\")   Wt 72.6 kg (160 lb)   BMI 27.46 kg/m      Weight:   160 lbs 0 oz    Patient is alert and oriented x4 in no acute distress. Vital signs were reviewed and are documented in electronic medical record. Neck was supple, no carotid bruits, thyromegaly, JVD, or lymphadenopathy was noted.   NEUROLOGY EXAM:    Patient s speech was normal with no aphasia or dysarthria. Mentation, and affect were also normal.     Funduscopic exam was normal, " with normal cup to disc ratio. Cranial nerves II -XII were intact.  Except she is hard of hearing    Patient had decreased mass with 5/5 strength    Sensation was decreased to LT, PP    Reflexes were 1+ symmetrical with downgoing toes.     No dysmetria noted on FNF     Gait testing was normal.     PERTINENT DIAGNOSTIC STUDIES     Following studies were reviewed:     CT BRAIN 11/15/2009  1.  No evidence of acute intracranial pathology    MRI BRAIN 11/15/2009   1.  No acute infarct seen  3.  Mild to moderate chronic small vessel ischemic changes in white matter of both hemisphere  3.  No evidence of intracranial mass, hemorrhage or obstructive hydrocephalus    EEG 11/16/2009  This is a normal study for awake and drowsy states    EEG 9/25/2012  This is a normal EEG for the awake and drowsy state    24-hour EEG 4/3/2014  This is an abnormal EEG recording for 24 hours with the patient described to be in wake, drowsy and sleep states. Patient had the presence of occasional sharp waves seen over the left and right anterior temporal region independently. No definite seizures are seen. There were occasional premature heartbeats seen on the EKG. However, patient was not having any cardiac symptoms at that time. Findings of sharp waves as noted do support a possible epilepsy condition given her clinical history and may suggest a lower potential seizure threshold. Clinical correlation is recommended.     PERTINENT LABS  Following labs were reviewed:  No visits with results within 3 Month(s) from this visit.   Latest known visit with results is:   Records - [facility] on 10/27/2020   Component Date Value     Levetiracetam 10/27/2020 10.9          Total time spent for face to face visit, reviewing labs/imaging studies, counseling and coordination of care was: 45 Minutes spent on the date of the encounter doing chart review, review of outside records, review of test results, interpretation of tests, patient visit and documentation        This note was dictated using voice recognition software.  Any grammatical or context distortions are unintentional and inherent to the software.    Orders Placed This Encounter   Procedures     Keppra (Levetiracetam) Level     Basic metabolic panel      New Prescriptions    No medications on file     Modified Medications    Modified Medication Previous Medication    LEVETIRACETAM (KEPPRA) 500 MG TABLET levETIRAcetam (KEPPRA) 500 MG tablet       Take 2 tablets (1,000 mg) by mouth At Bedtime    Take 2 tablets (1,000 mg) by mouth At Bedtime

## 2023-11-14 DIAGNOSIS — G40.209 PARTIAL SYMPTOMATIC EPILEPSY WITH COMPLEX PARTIAL SEIZURES, NOT INTRACTABLE, WITHOUT STATUS EPILEPTICUS (H): ICD-10-CM

## 2023-11-14 RX ORDER — LEVETIRACETAM 500 MG/1
TABLET ORAL
Qty: 180 TABLET | Refills: 0 | Status: SHIPPED | OUTPATIENT
Start: 2023-11-14 | End: 2024-01-23

## 2023-12-14 NOTE — TELEPHONE ENCOUNTER
Refill request for: Keppra    Directions: Take 2 tabs daily at bedtime     LOV: 8/26/22  NOV: 1/23/24    90 day supply with 0 refills Medication T'd for review and signature   
No

## 2024-01-20 NOTE — PROGRESS NOTES
NEUROLOGY FOLLOW UP VISIT  NOTE       [facility] NEUROLOGY [place]  [address] [place] [address]  Tel: [phone]  Fax: [phone]  [email or web address]     Tami Zaldivar,  1933, MRN [id number]  PCP: Joana Meyer  Date: 2024      ASSESSMENT & PLAN     Visit Diagnosis  Epilepsy with partial complex seizures (H)     Complex partial seizures  Pleasant 89-year-old female with HTN, peripheral neuropathy, osteoporosis who returns for yearly follow-up for complex partial seizures.  She had multiple episodes in the past of confusion and eventually was diagnosed with complex partial seizure with an abnormal EEG.  She has been on Keppra and has remained symptom free since .  Recently she was seen by her primary care physician and she had Keppra level checked that was therapeutic.  I have recommended:    1.  Continue Keppra 500 mg twice daily.  Previously for unclear reason she was taking 1000 mg at bedtime.  I offered to switch her to extended release Keppra but she feels she can easily take 1 tablet in the morning and 1 at night  2.  Keppra level checked on 2023 was therapeutic at 21.3.  Also basic metabolic panel was normal  3.  Follow-up in 1 year    Thank you again for this referral, please feel free to contact me if you have any questions.    Markel Steiner MD  [facility]  ([facility], [facility]     HISTORY OF PRESENT ILLNESS     Patient is 89-year-old female with history of HTN, peripheral neuropathy, osteoporosis last seen on 2022 for complex partial seizures who returns for follow-up.  During that visit she was continued on Keppra 1000 mg at bedtime and Keppra level was checked at a [facility] facility and was therapeutic.  She denies any seizures since her last visit.  However she is concerned as she lives alone and even if she has episodes of confusion she would not know.    Her symptoms started  in 2009 when she was perseverating and asking the same question over and over again.  Brain imaging was unremarkable EEG in 2009 and 2012 were normal.  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       She had a similar episode in 2012 and had trouble recalling the name of her daughter and then became confused.  Again EEG was unremarkable.  She had a similar episode later in that year when she had abdominal pain and cramping and had diarrhea and lost consciousness.  Repeat imaging study did not show any abnormality.  A 24-hour EEG was done in 2014 that showed left and right temporal independent sharp activity.  She was started on Keppra and since then had no episodes     PROBLEM LIST   Patient Active Problem List   Diagnosis Code    Epilepsy with partial complex seizures (H) G40.209    Lumbosacral radiculopathy M54.17    Neuropathy G62.9    Hypertension I10    Age-related osteoporosis without current pathological fracture M81.0    DDD (degenerative disc disease), lumbar M51.36         PAST MEDICAL & SURGICAL HISTORY     Past Medical History:   Patient  has a past medical history of Complex partial seizure (H).    Surgical History:  She  has no past surgical history on file.     SOCIAL HISTORY     Reviewed, and she  reports that she has never smoked. She has never used smokeless tobacco. She reports current alcohol use of about 1.0 standard drink of alcohol per week.     FAMILY HISTORY     Reviewed, and family history includes Coronary Artery Disease in her father.     ALLERGIES     No Known Allergies      REVIEW OF SYSTEMS     A 12 point review of system was performed and was negative except as outlined in the history of present illness.     HOME MEDICATIONS     Current Outpatient Rx   Medication Sig Dispense Refill    acetaminophen (TYLENOL) 500 MG tablet Take 500-1,000 mg by mouth every 6 hours as needed for mild pain      levETIRAcetam (KEPPRA) 500 MG tablet Take 1 tablet (500 mg) by mouth 2  "times daily 180 tablet 3    losartan (COZAAR) 50 MG tablet Take 50 mg by mouth daily      Probiotic Product (PROBIOTIC PO)            PHYSICAL EXAM     Vital signs  BP (!) 163/80 (BP Location: Right arm, Patient Position: Sitting)   Pulse 67   Ht 1.626 m (5' 4\")   Wt 72.6 kg (160 lb)   BMI 27.46 kg/m      Weight:   160 lbs 0 oz    Patient is alert and oriented x4 in no acute distress. Vital signs were reviewed and are documented in electronic medical record. Neck was supple, no carotid bruits, thyromegaly, JVD, or lymphadenopathy was noted.   NEUROLOGY EXAM:   Patient s speech was normal with no aphasia or dysarthria. Mentation, and affect were also normal.    Funduscopic exam was normal, with normal cup to disc ratio. Cranial nerves II -XII were intact.  Except she is hard of hearing   Patient had decreased mass with 5/5 strength   Sensation was decreased to LT, PP   Reflexes were 1+ symmetrical with downgoing toes.    No dysmetria noted on FNF    Gait testing was normal.      PERTINENT DIAGNOSTIC STUDIES     Following studies were reviewed:     CT BRAIN 11/15/2009  1.  No evidence of acute intracranial pathology     MRI BRAIN 11/15/2009   1.  No acute infarct seen  3.  Mild to moderate chronic small vessel ischemic changes in white matter of both hemisphere  3.  No evidence of intracranial mass, hemorrhage or obstructive hydrocephalus     EEG 11/16/2009  This is a normal study for awake and drowsy states     EEG 9/25/2012  This is a normal EEG for the awake and drowsy state     24-hour EEG 4/3/2014  This is an abnormal EEG recording for 24 hours with the patient described to be in wake, drowsy and sleep states. Patient had the presence of occasional sharp waves seen over the left and right anterior temporal region independently. No definite seizures are seen. There were occasional premature heartbeats seen on the EKG. However, patient was not having any cardiac symptoms at that time. Findings of sharp waves as " noted do support a possible epilepsy condition given her clinical history and may suggest a lower potential seizure threshold. Clinical correlation is recommended.     PERTINENT LABS  Following labs were reviewed:  No visits with results within 3 Month(s) from this visit.   Latest known visit with results is:   [facility] on 10/27/2020   Component Date Value Ref Range Status    Levetiracetam 10/27/2020 10.9  6.0 - 46.0 ug/mL Final      Ref Range & Units 11/7/2023   LEVETIRACETAM (KEPPRA)  6.0 - 46.0 ug/mL 21.3        Component  Ref Range & Units 2 mo ago   SODIUM  136 - 145 mmol/L 144   POTASSIUM  3.5 - 5.1 mmol/L 4.3   CHLORIDE  98 - 107 mmol/L 109 High    CO2,TOTAL  22 - 29 mmol/L 26   ANION GAP  5 - 18 9   GLUCOSE  70 - 99 mg/dL 99   CALCIUM  8.8 - 10.2 mg/dL 9.1   BUN  8 - 23 mg/dL 16   CREATININE  0.50 - 0.90 mg/dL 0.84   BUN/CREAT RATIO  10 - 20 19   eGFR  >90 mL/min/1.73m2 67 Low    Comment: As of 03/15/2022, eGFR is calculated by the CKD-EPI creatinine equation without race adjustment.  eGFR can be influenced by muscle mass, exercise, and diet.  The reported eGFR is an estimation only and is only applicable if the renal function is stable.   ALBUMIN  4.0 - 4.9 g/dL 4.1   PROTEIN,TOTAL  6.0 - 8.0 g/dL 6.4   BILIRUBIN,TOTAL  0.0 - 1.2 mg/dL 0.5   ALK PHOSPHATASE  35 - 104 IU/L 61   ALT (SGPT)  10 - 35 IU/L 17   AST (SGOT)  10 - 35 IU/L 26     Total time spent for face to face visit, reviewing labs/imaging studies, counseling and coordination of care was: 30 Minutes spent on the date of the encounter doing chart review, review of outside records, review of test results, interpretation of tests, patient visit, and documentation     This note was dictated using voice recognition software.  Any grammatical or context distortions are unintentional and inherent to the software.    No orders of the defined types were placed in this encounter.     New Prescriptions    No medications on file     Modified  Medications    Modified Medication Previous Medication    LEVETIRACETAM (KEPPRA) 500 MG TABLET levETIRAcetam (KEPPRA) 500 MG tablet       Take 1 tablet (500 mg) by mouth 2 times daily    TAKE 2 TABS BY MOUTH ONCE DAILY AT BEDTIME

## 2024-01-23 ENCOUNTER — OFFICE VISIT (OUTPATIENT)
Dept: NEUROLOGY | Facility: CLINIC | Age: 89
End: 2024-01-23
Payer: COMMERCIAL

## 2024-01-23 VITALS
SYSTOLIC BLOOD PRESSURE: 163 MMHG | BODY MASS INDEX: 27.31 KG/M2 | HEART RATE: 67 BPM | HEIGHT: 64 IN | DIASTOLIC BLOOD PRESSURE: 80 MMHG | WEIGHT: 160 LBS

## 2024-01-23 DIAGNOSIS — G40.209 EPILEPSY WITH PARTIAL COMPLEX SEIZURES (H): Primary | ICD-10-CM

## 2024-01-23 DIAGNOSIS — G40.209 PARTIAL SYMPTOMATIC EPILEPSY WITH COMPLEX PARTIAL SEIZURES, NOT INTRACTABLE, WITHOUT STATUS EPILEPTICUS (H): ICD-10-CM

## 2024-01-23 PROCEDURE — 99214 OFFICE O/P EST MOD 30 MIN: CPT | Performed by: PSYCHIATRY & NEUROLOGY

## 2024-01-23 RX ORDER — LEVETIRACETAM 500 MG/1
500 TABLET ORAL 2 TIMES DAILY
Qty: 180 TABLET | Refills: 3 | Status: SHIPPED | OUTPATIENT
Start: 2024-01-23

## 2024-01-23 NOTE — NURSING NOTE
Chief Complaint   Patient presents with    Seizures     Annual follow up- no new concerns      Marsha Esquivel CMA on 1/23/2024 at 12:33 PM  [facility]

## 2024-01-23 NOTE — LETTER
2024         RE: Tami Zaldivar  [address]  [facility] [address]        Dear Colleague,    Thank you for referring your patient, Tami Zaldivar, to the [facility] [place]. Please see a copy of my visit note below.    NEUROLOGY FOLLOW UP VISIT  NOTE       [facility] [place]  [address] [place] [address]  Tel: [phone]  Fax: [phone]  [email or web address]     Tami Zaldivar,  1933, MRN [id number]  PCP: Joana Meyer  Date: 2024      ASSESSMENT & PLAN     Visit Diagnosis  Epilepsy with partial complex seizures (H)     Complex partial seizures  Pleasant 89-year-old female with HTN, peripheral neuropathy, osteoporosis who returns for yearly follow-up for complex partial seizures.  She had multiple episodes in the past of confusion and eventually was diagnosed with complex partial seizure with an abnormal EEG.  She has been on Keppra and has remained symptom free since .  Recently she was seen by her primary care physician and she had Keppra level checked that was therapeutic.  I have recommended:    1.  Continue Keppra 500 mg twice daily.  Previously for unclear reason she was taking 1000 mg at bedtime.  I offered to switch her to extended release Keppra but she feels she can easily take 1 tablet in the morning and 1 at night  2.  Keppra level checked on 2023 was therapeutic at 21.3.  Also basic metabolic panel was normal  3.  Follow-up in 1 year    Thank you again for this referral, please feel free to contact me if you have any questions.    Markel Steiner MD  [facility]  (Formerly, [facility]     HISTORY OF PRESENT ILLNESS     Patient is 89-year-old female with history of HTN, peripheral neuropathy, osteoporosis last seen on 2022 for complex partial seizures who returns for follow-up.  During that visit she was continued on Keppra 1000  mg at bedtime and Keppra level was checked at a [facility] facility and was therapeutic.  She denies any seizures since her last visit.  However she is concerned as she lives alone and even if she has episodes of confusion she would not know.    Her symptoms started in 2009 when she was perseverating and asking the same question over and over again.  Brain imaging was unremarkable EEG in 2009 and 2012 were normal.  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       She had a similar episode in 2012 and had trouble recalling the name of her daughter and then became confused.  Again EEG was unremarkable.  She had a similar episode later in that year when she had abdominal pain and cramping and had diarrhea and lost consciousness.  Repeat imaging study did not show any abnormality.  A 24-hour EEG was done in 2014 that showed left and right temporal independent sharp activity.  She was started on Keppra and since then had no episodes     PROBLEM LIST   Patient Active Problem List   Diagnosis Code     Epilepsy with partial complex seizures (H) G40.209     Lumbosacral radiculopathy M54.17     Neuropathy G62.9     Hypertension I10     Age-related osteoporosis without current pathological fracture M81.0     DDD (degenerative disc disease), lumbar M51.36         PAST MEDICAL & SURGICAL HISTORY     Past Medical History:   Patient  has a past medical history of Complex partial seizure (H).    Surgical History:  She  has no past surgical history on file.     SOCIAL HISTORY     Reviewed, and she  reports that she has never smoked. She has never used smokeless tobacco. She reports current alcohol use of about 1.0 standard drink of alcohol per week.     FAMILY HISTORY     Reviewed, and family history includes Coronary Artery Disease in her father.     ALLERGIES     No Known Allergies      REVIEW OF SYSTEMS     A 12 point review of system was performed and was negative except as outlined in the history of present  "illness.     HOME MEDICATIONS     Current Outpatient Rx   Medication Sig Dispense Refill     acetaminophen (TYLENOL) 500 MG tablet Take 500-1,000 mg by mouth every 6 hours as needed for mild pain       levETIRAcetam (KEPPRA) 500 MG tablet Take 1 tablet (500 mg) by mouth 2 times daily 180 tablet 3     losartan (COZAAR) 50 MG tablet Take 50 mg by mouth daily       Probiotic Product (PROBIOTIC PO)            PHYSICAL EXAM     Vital signs  BP (!) 163/80 (BP Location: Right arm, Patient Position: Sitting)   Pulse 67   Ht 1.626 m (5' 4\")   Wt 72.6 kg (160 lb)   BMI 27.46 kg/m      Weight:   160 lbs 0 oz    Patient is alert and oriented x4 in no acute distress. Vital signs were reviewed and are documented in electronic medical record. Neck was supple, no carotid bruits, thyromegaly, JVD, or lymphadenopathy was noted.   NEUROLOGY EXAM:    Patient s speech was normal with no aphasia or dysarthria. Mentation, and affect were also normal.     Funduscopic exam was normal, with normal cup to disc ratio. Cranial nerves II -XII were intact.  Except she is hard of hearing    Patient had decreased mass with 5/5 strength    Sensation was decreased to LT, PP    Reflexes were 1+ symmetrical with downgoing toes.     No dysmetria noted on FNF     Gait testing was normal.      PERTINENT DIAGNOSTIC STUDIES     Following studies were reviewed:     CT BRAIN 11/15/2009  1.  No evidence of acute intracranial pathology     MRI BRAIN 11/15/2009   1.  No acute infarct seen  3.  Mild to moderate chronic small vessel ischemic changes in white matter of both hemisphere  3.  No evidence of intracranial mass, hemorrhage or obstructive hydrocephalus     EEG 11/16/2009  This is a normal study for awake and drowsy states     EEG 9/25/2012  This is a normal EEG for the awake and drowsy state     24-hour EEG 4/3/2014  This is an abnormal EEG recording for 24 hours with the patient described to be in wake, drowsy and sleep states. Patient had the " presence of occasional sharp waves seen over the left and right anterior temporal region independently. No definite seizures are seen. There were occasional premature heartbeats seen on the EKG. However, patient was not having any cardiac symptoms at that time. Findings of sharp waves as noted do support a possible epilepsy condition given her clinical history and may suggest a lower potential seizure threshold. Clinical correlation is recommended.     PERTINENT LABS  Following labs were reviewed:  No visits with results within 3 Month(s) from this visit.   Latest known visit with results is:   [facility] on 10/27/2020   Component Date Value Ref Range Status     Levetiracetam 10/27/2020 10.9  6.0 - 46.0 ug/mL Final      Ref Range & Units 11/7/2023   LEVETIRACETAM (KEPPRA)  6.0 - 46.0 ug/mL 21.3        Component  Ref Range & Units 2 mo ago   SODIUM  136 - 145 mmol/L 144   POTASSIUM  3.5 - 5.1 mmol/L 4.3   CHLORIDE  98 - 107 mmol/L 109 High    CO2,TOTAL  22 - 29 mmol/L 26   ANION GAP  5 - 18 9   GLUCOSE  70 - 99 mg/dL 99   CALCIUM  8.8 - 10.2 mg/dL 9.1   BUN  8 - 23 mg/dL 16   CREATININE  0.50 - 0.90 mg/dL 0.84   BUN/CREAT RATIO  10 - 20 19   eGFR  >90 mL/min/1.73m2 67 Low    Comment: As of 03/15/2022, eGFR is calculated by the CKD-EPI creatinine equation without race adjustment.  eGFR can be influenced by muscle mass, exercise, and diet.  The reported eGFR is an estimation only and is only applicable if the renal function is stable.   ALBUMIN  4.0 - 4.9 g/dL 4.1   PROTEIN,TOTAL  6.0 - 8.0 g/dL 6.4   BILIRUBIN,TOTAL  0.0 - 1.2 mg/dL 0.5   ALK PHOSPHATASE  35 - 104 IU/L 61   ALT (SGPT)  10 - 35 IU/L 17   AST (SGOT)  10 - 35 IU/L 26     Total time spent for face to face visit, reviewing labs/imaging studies, counseling and coordination of care was: 30 Minutes spent on the date of the encounter doing chart review, review of outside records, review of test results, interpretation of tests, patient visit, and  documentation     This note was dictated using voice recognition software.  Any grammatical or context distortions are unintentional and inherent to the software.    No orders of the defined types were placed in this encounter.     New Prescriptions    No medications on file     Modified Medications    Modified Medication Previous Medication    LEVETIRACETAM (KEPPRA) 500 MG TABLET levETIRAcetam (KEPPRA) 500 MG tablet       Take 1 tablet (500 mg) by mouth 2 times daily    TAKE 2 TABS BY MOUTH ONCE DAILY AT BEDTIME                 Again, thank you for allowing me to participate in the care of your patient.        Sincerely,        Markle Steiner MD